# Patient Record
Sex: FEMALE | Race: WHITE | Employment: FULL TIME | URBAN - METROPOLITAN AREA
[De-identification: names, ages, dates, MRNs, and addresses within clinical notes are randomized per-mention and may not be internally consistent; named-entity substitution may affect disease eponyms.]

---

## 2024-08-24 ENCOUNTER — HOSPITAL ENCOUNTER (EMERGENCY)
Age: 19
Discharge: HOME | End: 2024-08-24
Payer: COMMERCIAL

## 2024-08-24 VITALS
HEIGHT: 65 IN | RESPIRATION RATE: 18 BRPM | BODY MASS INDEX: 24.99 KG/M2 | SYSTOLIC BLOOD PRESSURE: 129 MMHG | WEIGHT: 150 LBS | OXYGEN SATURATION: 98 % | DIASTOLIC BLOOD PRESSURE: 89 MMHG | HEART RATE: 94 BPM | TEMPERATURE: 98.8 F

## 2024-08-24 DIAGNOSIS — J02.0 STREPTOCOCCAL SORE THROAT: Primary | ICD-10-CM

## 2024-08-24 LAB — STREP, MOLECULAR: DETECTED

## 2024-08-24 PROCEDURE — 87651 STREP A DNA AMP PROBE: CPT

## 2024-08-24 PROCEDURE — 99283 EMERGENCY DEPT VISIT LOW MDM: CPT

## 2024-08-24 RX ORDER — CEPHALEXIN 500 MG/1
500 CAPSULE ORAL 4 TIMES DAILY
Qty: 28 CAPSULE | Refills: 0 | Status: SHIPPED | OUTPATIENT
Start: 2024-08-24 | End: 2024-08-31

## 2024-08-24 ASSESSMENT — PAIN - FUNCTIONAL ASSESSMENT: PAIN_FUNCTIONAL_ASSESSMENT: 0-10

## 2024-08-24 ASSESSMENT — PAIN DESCRIPTION - PAIN TYPE: TYPE: ACUTE PAIN

## 2024-08-24 ASSESSMENT — PAIN DESCRIPTION - LOCATION: LOCATION: THROAT

## 2024-08-24 ASSESSMENT — PAIN SCALES - GENERAL: PAINLEVEL_OUTOF10: 4

## 2024-08-24 NOTE — DISCHARGE INSTRUCTIONS
Use antibiotics 4 times a day with food.  Use over-the-counter sore throat lozenges warm salt water gargles and Tylenol Motrin for any pain.  See your primary care physician next week for recheck

## 2024-08-24 NOTE — ED PROVIDER NOTES
Emergency Department Provider Note       PCP: Candelario Christine DO   Age: 19 y.o.   Sex: female     DISPOSITION Decision To Discharge 08/24/2024 02:52:46 PM  Condition at Disposition: Good       ICD-10-CM    1. Streptococcal sore throat  J02.0           Medical Decision Making     19-year-old female with sore throat started yesterday no other symptoms noted.  Strep screen is positive.  Will treat with Keflex and over-the-counter medicines for symptomatic relief.  Work note given strict return to ED precautions given     1 acute, uncomplicated illness or injury.  Prescription drug management performed.    I independently ordered and reviewed each unique test.             The patient has pharyngitis group A strep test positive antibiotics were prescribed.         History     19-year-old female to ER complaining of sore throat started yesterday.  She has had no fever chest pain or shortness of breath no meds taken for symptoms          ROS     Review of Systems   All other systems reviewed and are negative.       Physical Exam     Vitals signs and nursing note reviewed:  Vitals:    08/24/24 1414 08/24/24 1458   BP: 129/89    Pulse: (!) 116 94   Resp: 19 18   Temp: 98.8 °F (37.1 °C)    TempSrc: Oral    SpO2: 99% 98%   Weight: 68 kg (150 lb)    Height: 1.651 m (5' 5\")       Physical Exam  Vitals and nursing note reviewed.   Constitutional:       Appearance: Normal appearance. She is normal weight.   HENT:      Head: Normocephalic and atraumatic.      Right Ear: External ear normal.      Left Ear: External ear normal.      Nose: Nose normal.      Mouth/Throat:      Mouth: Mucous membranes are moist.      Pharynx: Pharyngeal swelling, oropharyngeal exudate and posterior oropharyngeal erythema present.      Tonsils: No tonsillar exudate or tonsillar abscesses. 1+ on the right. 1+ on the left.   Eyes:      Extraocular Movements: Extraocular movements intact.      Pupils: Pupils are equal, round, and reactive to light.

## 2024-08-24 NOTE — ED TRIAGE NOTES
Pt ambulatory to triage with steady gait. Pt reports sore throat and lymph node swelling that started yesterday.

## 2024-10-24 ENCOUNTER — HOSPITAL ENCOUNTER (EMERGENCY)
Age: 19
Discharge: HOME | End: 2024-10-24
Payer: COMMERCIAL

## 2024-10-24 VITALS
WEIGHT: 150 LBS | RESPIRATION RATE: 16 BRPM | TEMPERATURE: 99.5 F | BODY MASS INDEX: 24.99 KG/M2 | OXYGEN SATURATION: 100 % | HEIGHT: 65 IN | SYSTOLIC BLOOD PRESSURE: 136 MMHG | DIASTOLIC BLOOD PRESSURE: 89 MMHG | HEART RATE: 105 BPM

## 2024-10-24 DIAGNOSIS — J02.0 STREP PHARYNGITIS: Primary | ICD-10-CM

## 2024-10-24 DIAGNOSIS — J02.0 STREPTOCOCCAL SORE THROAT: ICD-10-CM

## 2024-10-24 LAB — STREP, MOLECULAR: DETECTED

## 2024-10-24 PROCEDURE — 87651 STREP A DNA AMP PROBE: CPT

## 2024-10-24 PROCEDURE — 99283 EMERGENCY DEPT VISIT LOW MDM: CPT

## 2024-10-24 RX ORDER — PENICILLIN V POTASSIUM 500 MG/1
500 TABLET, FILM COATED ORAL 2 TIMES DAILY
Qty: 20 TABLET | Refills: 0 | Status: SHIPPED | OUTPATIENT
Start: 2024-10-24 | End: 2024-11-03

## 2024-10-24 ASSESSMENT — PAIN - FUNCTIONAL ASSESSMENT: PAIN_FUNCTIONAL_ASSESSMENT: 0-10

## 2024-10-24 ASSESSMENT — PAIN SCALES - GENERAL: PAINLEVEL_OUTOF10: 5

## 2024-10-24 NOTE — ED TRIAGE NOTES
Pt c/o sore throat 2-3 days with a fever. Pt has had strep throat 2 times this year but did not finish antibiotics. Pt took tylenol around 0800.

## 2024-10-24 NOTE — DISCHARGE INSTRUCTIONS
You have strep pharyngitis.  This requires antibiotics to treat.  Given you have had multiple episodes of confirmed strep, you would likely would benefit from having a tonsillectomy.  I have sent information to ear nose [facility] throat on your behalf.  Call their office to schedule follow-up appointment

## 2024-10-24 NOTE — ED PROVIDER NOTES
Alcohol use: Yes    Drug use: Never     Social Determinants of Health     Social Connections: Unknown (3/20/2021)    Received from [facility]    Social Connections     Frequency of Communication with Friends and Family: Not asked     Frequency of Social Gatherings with Friends and Family: Not asked   Intimate Partner Violence: Unknown (3/20/2021)    Received from [facility]    Intimate Partner Violence     Fear of Current or Ex-Partner: Not asked     Emotionally Abused: Not asked     Physically Abused: Not asked     Sexually Abused: Not asked   Housing Stability: Not At Risk (3/10/2022)    Received from [facility]    Housing Stability     Was there a time when you did not have a steady place to sleep: Not asked     Worried that the place you are staying is making you sick: Not asked        Previous Medications    No medications on file        Results for orders placed or performed during the hospital encounter of 10/24/24   Group A Strep Screen By PCR    Specimen: Swab   Result Value Ref Range    Strep, Molecular Detected (A) NOTD           No orders to display                No results for input(s): \"COVID19\" in the last 72 hours.    Voice dictation software was used during the making of this note.  This software is not perfect and grammatical and other typographical errors may be present.  This note has not been completely proofread for errors.     Zak Peterson PA  10/24/24 2146